# Patient Record
Sex: FEMALE | NOT HISPANIC OR LATINO | Employment: UNEMPLOYED | URBAN - METROPOLITAN AREA
[De-identification: names, ages, dates, MRNs, and addresses within clinical notes are randomized per-mention and may not be internally consistent; named-entity substitution may affect disease eponyms.]

---

## 2023-10-31 ENCOUNTER — ALLIED HEALTH/NURSE VISIT (OUTPATIENT)
Dept: PEDIATRICS | Facility: CLINIC | Age: 58
End: 2023-10-31
Payer: COMMERCIAL

## 2023-10-31 DIAGNOSIS — Z23 HIGH PRIORITY FOR 2019-NCOV VACCINE: ICD-10-CM

## 2023-10-31 DIAGNOSIS — Z23 NEED FOR PROPHYLACTIC VACCINATION AND INOCULATION AGAINST INFLUENZA: Primary | ICD-10-CM

## 2023-10-31 PROCEDURE — 90480 ADMN SARSCOV2 VAC 1/ONLY CMP: CPT

## 2023-10-31 PROCEDURE — 90471 IMMUNIZATION ADMIN: CPT

## 2023-10-31 PROCEDURE — 90686 IIV4 VACC NO PRSV 0.5 ML IM: CPT

## 2023-10-31 PROCEDURE — 99207 PR NO CHARGE NURSE ONLY: CPT

## 2023-10-31 PROCEDURE — 91320 SARSCV2 VAC 30MCG TRS-SUC IM: CPT

## 2023-12-30 ENCOUNTER — HEALTH MAINTENANCE LETTER (OUTPATIENT)
Age: 58
End: 2023-12-30

## 2024-04-13 ENCOUNTER — OFFICE VISIT (OUTPATIENT)
Dept: URGENT CARE | Facility: URGENT CARE | Age: 59
End: 2024-04-13
Payer: COMMERCIAL

## 2024-04-13 VITALS
HEART RATE: 87 BPM | RESPIRATION RATE: 16 BRPM | SYSTOLIC BLOOD PRESSURE: 124 MMHG | WEIGHT: 156 LBS | DIASTOLIC BLOOD PRESSURE: 80 MMHG | TEMPERATURE: 98.9 F | OXYGEN SATURATION: 98 %

## 2024-04-13 DIAGNOSIS — R05.1 ACUTE COUGH: ICD-10-CM

## 2024-04-13 DIAGNOSIS — J01.90 ACUTE SINUSITIS WITH SYMPTOMS > 10 DAYS: Primary | ICD-10-CM

## 2024-04-13 PROCEDURE — 99203 OFFICE O/P NEW LOW 30 MIN: CPT | Performed by: PHYSICIAN ASSISTANT

## 2024-04-13 RX ORDER — BENZONATATE 200 MG/1
200 CAPSULE ORAL 3 TIMES DAILY PRN
Qty: 21 CAPSULE | Refills: 0 | Status: SHIPPED | OUTPATIENT
Start: 2024-04-13

## 2024-04-13 RX ORDER — AMOXICILLIN 875 MG
875 TABLET ORAL 2 TIMES DAILY
Qty: 20 TABLET | Refills: 0 | Status: SHIPPED | OUTPATIENT
Start: 2024-04-13 | End: 2024-04-23

## 2024-05-15 ENCOUNTER — OFFICE VISIT (OUTPATIENT)
Dept: OPHTHALMOLOGY | Facility: CLINIC | Age: 59
End: 2024-05-15
Attending: STUDENT IN AN ORGANIZED HEALTH CARE EDUCATION/TRAINING PROGRAM
Payer: COMMERCIAL

## 2024-05-15 DIAGNOSIS — H40.003 GLAUCOMA SUSPECT, BILATERAL: Primary | ICD-10-CM

## 2024-05-15 DIAGNOSIS — H52.03 HYPEROPIA OF BOTH EYES WITH ASTIGMATISM AND PRESBYOPIA: ICD-10-CM

## 2024-05-15 DIAGNOSIS — H52.4 HYPEROPIA OF BOTH EYES WITH ASTIGMATISM AND PRESBYOPIA: ICD-10-CM

## 2024-05-15 DIAGNOSIS — H52.203 HYPEROPIA OF BOTH EYES WITH ASTIGMATISM AND PRESBYOPIA: ICD-10-CM

## 2024-05-15 PROCEDURE — 92015 DETERMINE REFRACTIVE STATE: CPT

## 2024-05-15 PROCEDURE — G0463 HOSPITAL OUTPT CLINIC VISIT: HCPCS | Performed by: STUDENT IN AN ORGANIZED HEALTH CARE EDUCATION/TRAINING PROGRAM

## 2024-05-15 PROCEDURE — 92004 COMPRE OPH EXAM NEW PT 1/>: CPT | Performed by: STUDENT IN AN ORGANIZED HEALTH CARE EDUCATION/TRAINING PROGRAM

## 2024-05-15 PROCEDURE — 92133 CPTRZD OPH DX IMG PST SGM ON: CPT | Performed by: STUDENT IN AN ORGANIZED HEALTH CARE EDUCATION/TRAINING PROGRAM

## 2024-05-15 ASSESSMENT — CONF VISUAL FIELD
METHOD: COUNTING FINGERS
OD_SUPERIOR_TEMPORAL_RESTRICTION: 0
OS_INFERIOR_NASAL_RESTRICTION: 0
OS_NORMAL: 1
OS_SUPERIOR_TEMPORAL_RESTRICTION: 0
OD_INFERIOR_TEMPORAL_RESTRICTION: 0
OS_SUPERIOR_NASAL_RESTRICTION: 0
OS_INFERIOR_TEMPORAL_RESTRICTION: 0
OD_INFERIOR_NASAL_RESTRICTION: 0
OD_SUPERIOR_NASAL_RESTRICTION: 0
OD_NORMAL: 1

## 2024-05-15 ASSESSMENT — REFRACTION_MANIFEST
OD_AXIS: 175
OS_AXIS: 105
OS_CYLINDER: +0.25
OS_ADD: +2.50
OD_SPHERE: +3.50
OS_SPHERE: +3.25
OD_CYLINDER: +0.25
OD_ADD: +2.50

## 2024-05-15 ASSESSMENT — VISUAL ACUITY
OS_CC+: -1
CORRECTION_TYPE: GLASSES
OS_CC: 20/40
OD_CC+: -1
OD_CC: 20/30
METHOD: SNELLEN - LINEAR

## 2024-05-15 ASSESSMENT — CUP TO DISC RATIO
OS_RATIO: 0.6
OD_RATIO: 0.4

## 2024-05-15 ASSESSMENT — TONOMETRY
IOP_METHOD: TONOPEN
OS_IOP_MMHG: 20
OD_IOP_MMHG: 16

## 2024-05-15 ASSESSMENT — SLIT LAMP EXAM - LIDS
COMMENTS: WNL
COMMENTS: WNL

## 2024-05-15 ASSESSMENT — REFRACTION_WEARINGRX
OS_SPHERE: +2.75
OD_SPHERE: +2.50
OD_CYLINDER: +0.75
OD_ADD: +2.25
OD_AXIS: 152
SPECS_TYPE: PAL
OS_CYLINDER: SPHERE

## 2024-05-15 ASSESSMENT — EXTERNAL EXAM - RIGHT EYE: OD_EXAM: WNL

## 2024-05-15 ASSESSMENT — EXTERNAL EXAM - LEFT EYE: OS_EXAM: WNL

## 2024-05-15 NOTE — NURSING NOTE
"Chief Complaints and History of Present Illnesses   Patient presents with    COMPREHENSIVE EYE EXAM     Patient notes her glasses broke about 1 month ago. \"I need to get a new prescription for glasses.\" Patient notes vision is stable each eye within the last couple years. Denies eye surgery.     Lashell Cho, COT 8:59 AM 05/15/2024       Chief Complaint(s) and History of Present Illness(es)       COMPREHENSIVE EYE EXAM              Laterality: both eyes    Onset: years ago    Course: stable    Associated symptoms: double vision (very few times).  Negative for dryness, eye pain, redness, tearing, trauma, headache, flashes, floaters and itching    Treatments tried: glasses    Pain scale: 0/10    Comments: Patient notes her glasses broke about 1 month ago. \"I need to get a new prescription for glasses.\" Patient notes vision is stable each eye within the last couple years. Denies eye surgery.     Lashell Cho, COT 8:59 AM 05/15/2024                      "

## 2024-05-15 NOTE — PROGRESS NOTES
"HPI       COMPREHENSIVE EYE EXAM    In both eyes.  This started years ago.  Since onset it is stable.  Associated symptoms include double vision (very few times).  Negative for dryness, eye pain, redness, tearing, trauma, headache, flashes, floaters and itching.  Treatments tried include glasses.  Pain was noted as 0/10. Additional comments: Patient notes her glasses broke about 1 month ago. \"I need to get a new prescription for glasses.\" Patient notes vision is stable each eye within the last couple years. Denies eye surgery.     GRACIE Gotti 8:59 AM 05/15/2024            Last edited by Lashell Cho on 5/15/2024  8:59 AM.          Review of systems for the eyes was negative other than the pertinent positives/negatives listed in the HPI.    Ocular Meds: none    Ocular Hx: refractive error OU    FOHx: dad - glaucoma    PMHx: History reviewed. No pertinent past medical history.      Assessment & Plan      Jocelyn Moon is a 59 year old female with the following diagnoses:    Glaucoma suspect OU  - based off of increased c/d ratio left eye and c/d ratio asymmetry  - IOP wnl, CVF full to CF OU  - dad with glaucoma  - baseline OCT RNFL wnl OU   - observe off drops    Hyperopia of both eyes with astigmatism and presbyopia  - refraction reviewed with excellent BVCA    Counseled return/RD precautions    Patient disposition:   Return in about 1 year (around 5/15/2025) for Annual Visit, or sooner changes.    Attending Physician Attestation:  Complete documentation of historical and exam elements from today's encounter can be found in the full encounter summary report (not reduplicated in this progress note).  I personally obtained the chief complaint(s) and history of present illness.  I confirmed and edited as necessary the review of systems, past medical/surgical history, family history, social history, and examination findings as documented by others; and I examined the patient myself.  I personally reviewed the " relevant tests, images, and reports as documented above.  I formulated and edited as necessary the assessment and plan and discussed the findings and management plan with the patient and family. . - Princess Krishnan MD

## 2024-05-18 ENCOUNTER — HEALTH MAINTENANCE LETTER (OUTPATIENT)
Age: 59
End: 2024-05-18

## 2024-05-30 ENCOUNTER — APPOINTMENT (OUTPATIENT)
Dept: OPTOMETRY | Facility: CLINIC | Age: 59
End: 2024-05-30
Payer: COMMERCIAL

## 2024-05-30 PROCEDURE — V2200 LENS SPHER BIFOC PLANO 4.00D: HCPCS | Mod: RT | Performed by: OPTOMETRIST

## 2024-05-30 PROCEDURE — V2020 VISION SVCS FRAMES PURCHASES: HCPCS | Performed by: OPTOMETRIST

## 2024-06-12 ENCOUNTER — APPOINTMENT (OUTPATIENT)
Dept: OPTOMETRY | Facility: CLINIC | Age: 59
End: 2024-06-12
Payer: COMMERCIAL

## 2024-06-12 ENCOUNTER — OFFICE VISIT (OUTPATIENT)
Dept: PEDIATRICS | Facility: CLINIC | Age: 59
End: 2024-06-12
Payer: COMMERCIAL

## 2024-06-12 VITALS
OXYGEN SATURATION: 97 % | TEMPERATURE: 99 F | HEART RATE: 73 BPM | RESPIRATION RATE: 18 BRPM | BODY MASS INDEX: 27.3 KG/M2 | WEIGHT: 154.1 LBS | DIASTOLIC BLOOD PRESSURE: 68 MMHG | SYSTOLIC BLOOD PRESSURE: 106 MMHG | HEIGHT: 63 IN

## 2024-06-12 DIAGNOSIS — N39.41 URGE INCONTINENCE OF URINE: Primary | ICD-10-CM

## 2024-06-12 DIAGNOSIS — K59.00 CONSTIPATION, UNSPECIFIED CONSTIPATION TYPE: ICD-10-CM

## 2024-06-12 PROBLEM — G89.29 CHRONIC NONINTRACTABLE HEADACHE: Status: ACTIVE | Noted: 2023-03-08

## 2024-06-12 PROBLEM — R51.9 CHRONIC NONINTRACTABLE HEADACHE: Status: ACTIVE | Noted: 2023-03-08

## 2024-06-12 PROBLEM — F41.9 ANXIETY: Status: ACTIVE | Noted: 2023-03-08

## 2024-06-12 PROBLEM — N39.45 CONTINUOUS LEAKAGE OF URINE: Status: ACTIVE | Noted: 2017-09-15

## 2024-06-12 PROBLEM — R53.81 MALAISE AND FATIGUE: Status: ACTIVE | Noted: 2017-09-23

## 2024-06-12 PROBLEM — H81.10 BENIGN PAROXYSMAL POSITIONAL VERTIGO: Status: ACTIVE | Noted: 2023-03-08

## 2024-06-12 PROBLEM — R53.83 MALAISE AND FATIGUE: Status: ACTIVE | Noted: 2017-09-23

## 2024-06-12 PROBLEM — G47.33 OSA (OBSTRUCTIVE SLEEP APNEA): Status: ACTIVE | Noted: 2018-10-01

## 2024-06-12 PROBLEM — E66.09 OBESITY DUE TO EXCESS CALORIES: Status: ACTIVE | Noted: 2017-09-23

## 2024-06-12 PROBLEM — R41.3 MEMORY LOSS: Status: ACTIVE | Noted: 2023-03-08

## 2024-06-12 PROCEDURE — G2211 COMPLEX E/M VISIT ADD ON: HCPCS

## 2024-06-12 PROCEDURE — 99213 OFFICE O/P EST LOW 20 MIN: CPT | Mod: GE

## 2024-06-12 PROCEDURE — 92341 FIT SPECTACLES BIFOCAL: CPT | Performed by: OPTOMETRIST

## 2024-06-12 RX ORDER — MECLIZINE HYDROCHLORIDE 25 MG/1
25 TABLET ORAL
COMMUNITY
Start: 2023-03-09

## 2024-06-12 RX ORDER — ASPIRIN 81 MG/1
1 TABLET ORAL DAILY
COMMUNITY

## 2024-06-12 RX ORDER — ESCITALOPRAM OXALATE 10 MG/1
10 TABLET ORAL DAILY
COMMUNITY
Start: 2023-03-09

## 2024-06-12 RX ORDER — PHENOL 1.4 %
600 AEROSOL, SPRAY (ML) MUCOUS MEMBRANE DAILY
COMMUNITY

## 2024-06-12 RX ORDER — ATORVASTATIN CALCIUM 20 MG/1
20 TABLET, FILM COATED ORAL DAILY
COMMUNITY
Start: 2023-03-09

## 2024-06-12 RX ORDER — ASCORBIC ACID 1000 MG
10 TABLET ORAL DAILY
COMMUNITY

## 2024-06-12 ASSESSMENT — PAIN SCALES - GENERAL: PAINLEVEL: NO PAIN (0)

## 2024-06-12 NOTE — PROGRESS NOTES
Assessment & Plan     Urge incontinence of urine  Constipation, unspecified constipation type  Symptoms clinically consistent with urge incontinence, no stress incontinence signs. Constipation also likely related to pelvic floor dysfunction as stools are soft and sometimes regular, but other times require rectal stimulation. Will send her to pelvic floor PT and uro-gynecology for re-evaluation as the last time she was seen was in 2017.  - Physical Therapy  Referral for pelvic floor PT  - Adult Uro/Gyn  Referral  - Previously on Mybetriq without relief, does not want to try other medications right now.  - Discussed miralax but stools are already soft and patient does not want to use medications.    Follow up:  Return when able to discuss other concerns (leg swelling, knee pain, cold hands and feet, vertigo, wanting thyroid and calcium testing)      Consuelo Banks is a 59 year old, presenting for the following health issues:  Urinary Problem      6/12/2024    12:44 PM   Additional Questions   Roomed by Darshana   Accompanied by spouse         6/12/2024    12:44 PM   Patient Reported Additional Medications   Patient reports taking the following new medications none     HPI     Tajik  via video used during visit, though patient prefers to mostly speak in English.     Genitourinary - Female  Onset/Duration: over 25 years, worsening  Description:   Painful urination (Dysuria): No           Frequency: YES  Blood in urine (Hematuria): No  Delay in urine (Hesitency): YES  Intensity: moderate  Progression of Symptoms:  worsening  Accompanying Signs & Symptoms:  Fever/chills: No  Flank pain: No  Nausea and vomiting: No  Vaginal symptoms: none  Abdominal/Pelvic Pain: No  History:   History of frequent UTI s: No  History of kidney stones: No  Sexually Active: No  Possibility of pregnancy: No  Precipitating or alleviating factors: None  Therapies tried and outcome: none    Feet are discolored and  "swollen, hands and feet cold, would like thyroid and calcium tested  Also has knee problems, fluid on knees   Also has vertigo and constipation    Urinary incontinence: gets frequent urge for urination, urine comes out just a little but then she has ongoing urgency. Sits on toilet for a long time. Feels like she has to go use the toilet every 45 minutes to two hours. Urine leaks out, urethral opening is near vagina so she puts toilet paper in vagina to help manage this, also wears pads. No leakage with sneezing, coughing, laughing. Does have a small amount of clear sticky discharge from urinary opening at times. No dysuria, hematuria, vaginal pain or itching, abnormal vaginal odor or discharge. No fevers, abdominal pain, nausea, vomiting, skin rashes or sores. Has tried a medicine (Mybetriq) and saw urogyn and pelvic floor PT in 2017, does not want to try medicine again as it did not help but is open to seeing urogyn and pelvic floor PT again. At that time diagnosed with overactive bladder, urge incontinence, pelvic floor tension and weakness, posterior vaginal wall prolapse, and constipation. Did home PT exercises, does not feel like they helped.    No previous surgery on bladder. No vaginal bleeding. Never has had abnormal Pap smear, but no records available. Has had hysterectomy.     Constipation: sometimes stools daily, sometimes only every 2-3 days, has to use finger stimulation if she goes long enough without stool. Stool is soft, just does not come out when she has the urge unless she uses digital stimulation. No blood or mucus in stools, no melena. Has never had a colonoscopy. Not taking medicine for stool softening. Does not want medicine for constipation as it makes her body \"weak.\" Does not drink a lot of water as this makes her urination worse. Tried miralax but did not like it. Wonders what she can do aside from drinking more water and taking medicine.        Objective    /68 (BP Location: Right " "arm, Patient Position: Sitting, Cuff Size: Adult Regular)   Pulse 73   Temp 99  F (37.2  C) (Oral)   Resp 18   Ht 1.6 m (5' 3\")   Wt 69.9 kg (154 lb 1.6 oz)   LMP  (LMP Unknown)   SpO2 97%   BMI 27.30 kg/m    Body mass index is 27.3 kg/m .  Physical Exam   Gen: awake and alert, no apparent distress, appears stated age, sitting comfortably upright in chair  HEENT: head atraumatic and normocephalic, hearing grossly normal, PERRLA, EOM grossly intact, no conjunctival injection or icterus, no nasal drainage, no oral ulcers, normal dentition, moist mucous membranes  Neck: supple, no lymphadenopathy, no stiffness, normal ROM  Back: spine is straight, spine and paraspinal muscles non-tender to palpation throughout, full ROM  CV: RRR, normal S1 and S2, no murmurs, rubs, or gallops, normal radial pulses, normal capillary refill.  Pulm: CTAB, no wheezes, rhonchi, or rales. No increased WOB on room air.  Abd: soft, non-tender, non-distended, normal bowel sounds throughout, no CVA tenderness.  Ext: 2+ non-pitting LE edema around ankles and lower legs L>R, no tenderness to calves or erythema in calves, no joint swelling, full ROM of all joints in upper and lower extremities  Skin: warm and dry, no rashes, pallor, cyanosis, jaundice, or bruising to visible skin.  Neuro: A&Ox3, answers questions appropriately, normal speech, CN II-XII grossly intact, normal muscle tone, moves all extremities equally.  Psych: normal affect, makes good eye contact          Signed Electronically by: Dulce Maria Johnson MD    "

## 2024-10-31 ENCOUNTER — THERAPY VISIT (OUTPATIENT)
Dept: PHYSICAL THERAPY | Facility: CLINIC | Age: 59
End: 2024-10-31
Attending: INTERNAL MEDICINE
Payer: COMMERCIAL

## 2024-10-31 DIAGNOSIS — N39.41 URGE INCONTINENCE OF URINE: ICD-10-CM

## 2024-10-31 DIAGNOSIS — K59.00 CONSTIPATION, UNSPECIFIED CONSTIPATION TYPE: ICD-10-CM

## 2024-10-31 PROCEDURE — 97161 PT EVAL LOW COMPLEX 20 MIN: CPT | Mod: GP

## 2024-10-31 PROCEDURE — 97530 THERAPEUTIC ACTIVITIES: CPT | Mod: GP

## 2024-10-31 NOTE — PROGRESS NOTES
PHYSICAL THERAPY EVALUATION  Type of Visit: Evaluation       Fall Risk Screen:  Fall screen completed by: PT  Have you fallen 2 or more times in the past year?: No  Have you fallen and had an injury in the past year?: No  Timed Up and Go score (seconds): No  Is patient a fall risk?: No  Fall screen comments: No    Subjective         Presenting condition or subjective complaint: (Patient-Rptd) urine incontinace,frequent visit to bath room for urination,leakage of urine  Date of onset: 06/12/24    Relevant medical history: (Patient-Rptd) Cold or hot arm or leg; Dizziness; Implanted device; Incontinence; Ongoing fever or chills; Seizures; Severe dizziness; Sleep disorder like apnea; Vision problems   Dates & types of surgery: (Patient-Rptd) removal of utarus    Prior diagnostic imaging/testing results:       Prior therapy history for the same diagnosis, illness or injury: (Patient-Rptd) No      Living Environment  Social support: (Patient-Rptd) With a significant other or spouse   Type of home: (Patient-Rptd) Multi-level; Basement   Stairs to enter the home: (Patient-Rptd) Yes (Patient-Rptd) 14 Is there a railing: (Patient-Rptd) Yes     Ramp: (Patient-Rptd) Yes   Stairs inside the home: (Patient-Rptd) Yes (Patient-Rptd) 14 Is there a railing: (Patient-Rptd) Yes     Help at home: (Patient-Rptd) None; Medication and/or finances; Emergency call system  Equipment owned:       Employment: (Patient-Rptd) Not Applicable    Hobbies/Interests:      Patient goals for therapy: (Patient-Rptd) in the start getting rid of forceful urge reulting leakage very frequently     Objective      PELVIC EVALUATION  ADDITIONAL HISTORY:  Sex assigned at birth: (Patient-Rptd) Female  Gender identity: (Patient-Rptd) Female    Pronouns: (Patient-Rptd) She/Her Hers      Bladder History:  Feels bladder filling: (Patient-Rptd) No  Triggers for feeling of inability to wait to go to the bathroom: (Patient-Rptd) Yes (Patient-Rptd) after visit out side  entering my home  How long can you wait to urinate: (Patient-Rptd) not at all  Gets up at night to urinate: (Patient-Rptd) Yes (Patient-Rptd) two or three times  Can stop the flow of urine when urinating: (Patient-Rptd) No  Volume of urine usually released: (Patient-Rptd) Small   Other issues: (Patient-Rptd) Trouble emptying bladder completely; Dribbling after urinating  Number of bladder infections in last 12 months:    Fluid intake per day: (Patient-Rptd) 3 glasses      Medications taken for bladder: (Patient-Rptd) No     Activities causing urine leak: (Patient-Rptd) Hurrying to the bathroom due to a strong urge to urinate (pee)    Amount of urine typically leaked: (Patient-Rptd) about a table spoon or sometime whole urine  Pads used to help with leaking: (Patient-Rptd) Yes I use this many pads per day: (Patient-Rptd) every time i go to bath room   I use this type/brand: (Patient-Rptd) depend fresh protection      Bowel History:  Frequency of bowel movement: (Patient-Rptd) once  Consistency of stool: (Patient-Rptd) Other (Patient-Rptd) some time hard and some time soft  Ignores the urge to defecate:    Other bowel issues:    Length of time spent trying to have a bowel movement:      Sexual Function History:  Sexual orientation: (Patient-Rptd) Prefer not to say    Sexually active: (Patient-Rptd) No  Lubrication used: (Patient-Rptd) No (Patient-Rptd) No  Pelvic pain:      Pain or difficulty with orgasms/erection/ejaculation:      State of menopause: (Patient-Rptd) Post-menopause (I am done with menopause)  Hormone medications: (Patient-Rptd) No      Are you currently pregnant: (Patient-Rptd) No  Number of previous pregnancies: (Patient-Rptd) 2 and 2 miscariage  Number of deliveries: (Patient-Rptd) 2  If you have delivered before, did you have any of these issues during delivery: (Patient-Rptd) Vaginal delivery  Have you been diagnosed with pelvic prolapse or abdominal separation: (Patient-Rptd) No  Do you get  regular exercise: (Patient-Rptd) No  Have you tried pelvic floor strengthening exercises for 4 weeks: (Patient-Rptd) No  Do you have any history of trauma that is relevant to your care that you d like to share: (Patient-Rptd) No      Discussed reason for referral regarding pelvic health needs and external/internal pelvic floor muscle examination with patient/guardian.  Opportunity provided to ask questions and verbal consent for assessment and intervention was given.    In 1993, after a urinary infection, bladder leakage began. Urge present, between a few drops and a full bladder empty. Jocelyn reports that it is very difficult to stop the flow of urine once it starts. She uses tissue paper in her vagina to stop urinary leakage. She has hx of 3rd degree uterine prolapse, so she had a hysterectomy. This did not help with continence.   POSTURE: WFL  LUMBAR SCREEN: AROM WFL  HIP SCREEN:  Strength: WFL     PELVIC/SI SCREEN:  PELVIS/SI SPECIAL TESTS: WNL  BREATHING SYMMETRY: Rib cage flaring, Decreased rib cage mobility    ABDOMINAL ASSESSMENT    Abdominal Activation/Strength:  Poor abdominal coordination, rectus dominance    Fascial Tension/Restriction: WNL    Assessment & Plan   CLINICAL IMPRESSIONS  Medical Diagnosis: Urge incontinence of urine  Constipation, unspecified constipation type    Treatment Diagnosis: Pelvic flloor dysfunction   Impression/Assessment: Patient is a 59 year old female with urinary incontinence and constipation complaints.  The following significant findings have been identified: Decreased ROM/flexibility, Decreased strength, Impaired muscle performance, and Decreased activity tolerance. These impairments interfere with their ability to perform self care tasks, recreational activities, and community mobility as compared to previous level of function.     Clinical Decision Making (Complexity):  Clinical Presentation: Stable/Uncomplicated  Clinical Presentation Rationale: based on medical and  personal factors listed in PT evaluation  Clinical Decision Making (Complexity): Low complexity    PLAN OF CARE  Treatment Interventions:  Modalities: Biofeedback  Interventions: Manual Therapy, Neuromuscular Re-education, Therapeutic Activity, Therapeutic Exercise, Self-Care/Home Management    Long Term Goals     PT Goal 1  Goal Identifier: Bulge out  Goal Description: The patient will be able to bulge out visibly when cued verbally.  Rationale: to maximize safety and independence with performance of ADLs and functional tasks;to maximize safety and independence with self cares  Target Date: 01/09/25  PT Goal 2  Goal Identifier: Pelvic floor contraction (rectal)  Goal Description: The patient will demonstrate both a squeeze and a pull forward rectally during a pelvic floor contraction.  Rationale: to maximize safety and independence with performance of ADLs and functional tasks;to maximize safety and independence with self cares  Target Date: 01/09/25  PT Goal 3  Goal Identifier: Bowel habits  Goal Description: The patient will have 1-2 soft-formed bowel movements daily.  Rationale: to maximize safety and independence with performance of ADLs and functional tasks;to maximize safety and independence with self cares  Target Date: 01/09/25  PT Goal 4  Goal Identifier: Goal 4  Goal Description: The patient will use the restroom less than 8 times a day and no more than 1 x at night to maximize independence and restorative sleep patterns.  Rationale: to maximize safety and independence within the home;to maximize safety and independence within the community;to maximize safety and independence with transportation;to maximize safety and independence with self cares;to maximize safety and independence with performance of ADLs and functional tasks  Target Date: 01/09/25      Frequency of Treatment: 1 x per week  Duration of Treatment: 10 weeks  Education Assessment:   Learner/Method: Patient;Pictures/Video;No Barriers to  Learning;Demonstration;Listening;Reading    Risks and benefits of evaluation/treatment have been explained.   Patient/Family/caregiver agrees with Plan of Care.     Evaluation Time:     PT Eval, Low Complexity Minutes (42144): 10  Evaluation Only     Signing Clinician: ELENI Velez [facility]                                                                                   OUTPATIENT PHYSICAL THERAPY      PLAN OF TREATMENT FOR OUTPATIENT REHABILITATION   Patient's Last Name, First Name, Jocelyn Brito YOB: 1965   Provider's Name   [facility]   Medical Record No.  [id number]     Onset Date: 06/12/24  Start of Care Date: 10/31/24     Medical Diagnosis:  Urge incontinence of urine  Constipation, unspecified constipation type      PT Treatment Diagnosis:  Pelvic flloor dysfunction Plan of Treatment  Frequency/Duration: 1 x per week/ 10 weeks    Certification date from 10/31/24 to 01/09/25         See note for plan of treatment details and functional goals     Kateryna Huerta PT                         I CERTIFY THE NEED FOR THESE SERVICES FURNISHED UNDER        THIS PLAN OF TREATMENT AND WHILE UNDER MY CARE     (Physician attestation of this document indicates review and certification of the therapy plan).              Referring Provider:  Soumya Howard    Initial Assessment  See Epic Evaluation- Start of Care Date: 10/31/24

## 2024-12-05 ENCOUNTER — TELEPHONE (OUTPATIENT)
Dept: PHYSICAL THERAPY | Facility: CLINIC | Age: 59
End: 2024-12-05
Payer: COMMERCIAL

## 2024-12-05 DIAGNOSIS — K59.00 CONSTIPATION, UNSPECIFIED CONSTIPATION TYPE: Primary | ICD-10-CM

## 2025-03-10 PROBLEM — K59.00 CONSTIPATION: Status: RESOLVED | Noted: 2017-09-15 | Resolved: 2025-03-10

## 2025-03-25 ENCOUNTER — OFFICE VISIT (OUTPATIENT)
Dept: UROLOGY | Facility: CLINIC | Age: 60
End: 2025-03-25
Payer: COMMERCIAL

## 2025-03-25 VITALS — DIASTOLIC BLOOD PRESSURE: 88 MMHG | HEART RATE: 73 BPM | OXYGEN SATURATION: 98 % | SYSTOLIC BLOOD PRESSURE: 135 MMHG

## 2025-03-25 DIAGNOSIS — N39.41 URGENCY INCONTINENCE: Primary | ICD-10-CM

## 2025-03-25 DIAGNOSIS — R19.8 ABNORMAL DEFECATION: ICD-10-CM

## 2025-03-25 DIAGNOSIS — Z98.890 HISTORY OF PELVIC SURGERY: ICD-10-CM

## 2025-03-25 DIAGNOSIS — K59.00 CONSTIPATION, UNSPECIFIED CONSTIPATION TYPE: ICD-10-CM

## 2025-03-25 LAB
ALBUMIN UR-MCNC: NEGATIVE MG/DL
AMORPH CRY #/AREA URNS HPF: ABNORMAL /HPF
APPEARANCE UR: CLEAR
BILIRUB UR QL STRIP: NEGATIVE
COLOR UR AUTO: YELLOW
GLUCOSE UR STRIP-MCNC: NEGATIVE MG/DL
HGB UR QL STRIP: ABNORMAL
KETONES UR STRIP-MCNC: NEGATIVE MG/DL
LEUKOCYTE ESTERASE UR QL STRIP: NEGATIVE
MUCOUS THREADS #/AREA URNS LPF: PRESENT /LPF
NITRATE UR QL: NEGATIVE
PH UR STRIP: 5.5 [PH] (ref 5–7)
RBC URINE: 0 /HPF
RESIDUAL VOLUME (RV) (EXTERNAL): 22
SP GR UR STRIP: >=1.03 (ref 1–1.03)
SQUAMOUS EPITHELIAL: <1 /HPF
UROBILINOGEN UR STRIP-ACNC: 0.2 E.U./DL
WBC URINE: 4 /HPF

## 2025-03-25 PROCEDURE — 81001 URINALYSIS AUTO W/SCOPE: CPT | Performed by: UROLOGY

## 2025-03-25 NOTE — PATIENT INSTRUCTIONS
Happy early birthday!    Websites with free information:    [employer] patient website: www.voicesforpfd.org    Total Control Program: www.totalcontrolprogram.com    Please do double voiding    It was a pleasure meeting with you today.  Thank you for allowing me and my team the privilege of caring for you today.  YOU are the reason we are here, and I truly hope we provided you with the excellent service you deserve.  Please let us know if there is anything else we can do for you so that we can be sure you are leaving completely satisfied with your care experience.    Cystoscopy    Cystoscopy is a procedure that lets your doctor look directly inside your urethra and bladder. It can be used to:  Help diagnose a problem with your urethra, bladder, or kidneys.  Take a sample (biopsy) of bladder or urethral tissue.  Treat certain problems (such as removing kidney stones).  Place a stent to bypass an obstruction.  Take special X-rays of the kidneys.  Based on the findings, your doctor may recommend other tests or treatments.  What is a cystoscope?  A cystoscope is a telescope-like instrument that contains lenses and fiberoptics (small glass wires that make bright light). The cystoscope may be straight and rigid, or flexible to bend around curves in the urethra. The doctor may look directly into the cystoscope, or project the image onto a monitor.  Getting ready  Ask your doctor if you should stop taking any medicines before the procedure.  Follow any other instructions your doctor gives you.  Tell your doctor before the exam if you:  Take any medicines, such as aspirin or blood thinners  Have allergies to any medicines  Are pregnant   The procedure  Cystoscopy is done in the doctor s office, surgery center, or hospital. The doctor and a nurse are present during the procedure. It takes only a few minutes, longer if a biopsy, X-ray, or treatment needs to be done.  During the procedure:  You lie on an  exam table on your back, knees bent and legs apart. You are covered with a drape.  Your urethra and the area around it are washed. Anesthetic jelly may be applied to numb the urethra.  The cystoscope is inserted. A sterile fluid is put into the bladder to expand it. You may feel pressure from this fluid.  When the procedure is done, the cystoscope is removed.  After the procedure   Once you re home:  Drink plenty of fluids.  You may have burning or light bleeding when you urinate--this is normal.  Medicines may be prescribed to ease any discomfort or prevent infection. Take these as directed.  Call your doctor if you have heavy bleeding or blood clots, burning that lasts more than a day, a fever over 100 F  (38  C), or trouble urinating.  Date Last Reviewed: 1/1/2017 2000-2017 [employer]. [address] [place] [address]. All rights reserved. This information is not intended as a substitute for professional medical care. Always follow your healthcare professional's instructions.

## 2025-03-25 NOTE — NURSING NOTE
Chief Complaint   Patient presents with    Urinary Problem     Patient here today discuss her incontinence          Patient state she has some incontinence   Patient state she has frequency  Patient it feel like she only only urinate a little at a time  Patient that she has leakage as well  Patient voided in clinic  Patient PVR 22 ml by bladder scan           [id number] Fior  Very kind lady Lexus Bauer, [facility]

## 2025-03-25 NOTE — PROGRESS NOTES
March 25, 2025    Referring Provider: Referred Self, MD  No address on file    Primary Care Provider: No Ref-Primary, Physician    {PROVIDER CHARTING PREFERENCE:[id number]}    *** minutes were spent on this day of the encounter in reviewing the EMR including ***, direct patient care, coordination of care and documentation    Crystal Oconnell MD MPH  (she/her/hers)   of Urology  [facility]      HPI:  Jocelyn Moon is a 59 year old female who presents for evaluation of her pelvic floor symptoms.  She does speak English but the entire visit is conducted with the assistance of a Occitan  as needed. has ***.  with ***    Has been having urine leaking for at least 25 years, soon as she gets the urge she will sometimes leak a couple drops other times a large volume, wears a diaper.  She has tried one medication but did not help.  Also constipation ***    Denies gross hematuria, UTI, stress incontinence, vaginal bulge, dyspareunia.    Had a hysterectomy for prolapse back in [place].    Health maintenance screening:  Pap smear ***  Colonoscopy ***  Mammogram ***    History reviewed. No pertinent past medical history.    History reviewed. No pertinent surgical history.    Social History     Socioeconomic History    Marital status:      Spouse name: Not on file    Number of children: Not on file    Years of education: Not on file    Highest education level: Not on file   Occupational History    Not on file   Tobacco Use    Smoking status: Never    Smokeless tobacco: Never   Vaping Use    Vaping status: Never Used   Substance and Sexual Activity    Alcohol use: Not on file    Drug use: Not on file    Sexual activity: Not on file   Other Topics Concern    Not on file   Social History Narrative    Not on file     Social Drivers of Health     Financial Resource Strain: Not on file   Food Insecurity: Not on file   Transportation Needs: Not on file   Physical Activity: Not on file   Stress:  Not on file   Social Connections: Not on file   Interpersonal Safety: Low Risk  (10/31/2024)    Interpersonal Safety     Do you feel physically and emotionally safe where you currently live?: Yes     Within the past 12 months, have you been hit, slapped, kicked or otherwise physically hurt by someone?: No     Within the past 12 months, have you been humiliated or emotionally abused in other ways by your partner or ex-partner?: No   Housing Stability: Not on file     Family History   Problem Relation Age of Onset    Glaucoma No family hx of     Macular Degeneration No family hx of      ROS    No Known Allergies    Current Outpatient Medications   Medication Sig Dispense Refill    aspirin 81 MG EC tablet Take 1 tablet by mouth daily      atorvastatin (LIPITOR) 20 MG tablet Take 20 mg by mouth daily      benzonatate (TESSALON) 200 MG capsule Take 1 capsule (200 mg) by mouth 3 times daily as needed for cough (Patient not taking: Reported on 5/15/2024) 21 capsule 0    calcium carbonate (OS-FELIBERTO) 600 MG tablet Take 600 mg by mouth daily      Coenzyme Q10 (CO Q 10) 10 MG CAPS Take 10 mg by mouth daily      escitalopram (LEXAPRO) 10 MG tablet Take 10 mg by mouth daily      meclizine (ANTIVERT) 25 MG tablet Take 25 mg by mouth       No current facility-administered medications for this visit.     /88   Pulse 73   LMP  (LMP Unknown)   SpO2 98%   GENERAL: healthy, alert and no distress  EYES: Eyes grossly normal to inspection, conjunctivae and sclerae normal  HENT: normal cephalic/atraumatic.  External ears, nose and mouth without ulcers or lesions.  RESP: no audible wheeze, cough, or visible cyanosis.  No visible retractions or increased work of breathing.  Able to speak fully in complete sentences.  NEURO: Cranial nerves grossly intact, mentation intact and speech normal  PSYCH: mentation appears normal, affect normal/bright, judgement and insight intact, normal speech and appearance well-groomed    Urine dip small  blood, urinalysis 0 RBC, 4 WBC, < 1 squam but some crystals and mucus    PVR 22 mL by bladder scan      CC  Patient Care Team:  No Ref-Primary, Physician as PCP - General  No Ref-Primary, Physician  Anjali Jean Baptiste OD as MD (Ophthalmology)  Dulce Maria Johnson MD as Assigned PCP  Crystal Oconnell MD as MD (Urology)  Princess Krishnan MD as Assigned Surgical Provider  SELF, REFERRED

## 2025-04-07 ENCOUNTER — TELEPHONE (OUTPATIENT)
Dept: GASTROENTEROLOGY | Facility: CLINIC | Age: 60
End: 2025-04-07
Payer: COMMERCIAL

## 2025-04-07 NOTE — TELEPHONE ENCOUNTER
"Endoscopy Scheduling Screen    Have you had any respiratory illness or flu-like symptoms in the last 10 days?  No    What is your communication preference for Instructions and/or Bowel Prep?   MAIL    What insurance is in the chart?  Other:  BP    Ordering/Referring Provider:     JIGNA ARREOLA      (If ordering provider performs procedure, schedule with ordering provider unless otherwise instructed. )    BMI: Estimated body mass index is 27.3 kg/m  as calculated from the following:    Height as of 6/12/24: 1.6 m (5' 3\").    Weight as of 6/12/24: 69.9 kg (154 lb 1.6 oz).     Sedation Ordered  moderate sedation.   If patient BMI > 50 do not schedule in ASC.    If patient BMI > 45 do not schedule at [facility].    Are you taking methadone or Suboxone?  NO, No RN review required.    Have you been diagnosed and are being treated for severe PTSD or severe anxiety?  NO, No RN review required.    Are you taking any prescription medications for pain 3 or more times per week?   NO, No RN review required.    Do you have a history of malignant hyperthermia?  No    (Females) Are you currently pregnant?        Have you been diagnosed or told you have pulmonary hypertension?   No    Do you have an LVAD?  No    Have you been told you have moderate to severe sleep apnea?  Yes. Do you use a CPAP? No. (RN Review required for scheduling unless scheduling in Hospital.)     Have you been told you have COPD, asthma, or any other lung disease?  No    Has your doctor ordered any cardiac tests like echo, angiogram, stress test, ablation, or EKG, that you have not completed yet?  No    Do you  have a history of any heart conditions?  No     Have you ever had or are you waiting for an organ transplant?  No. Continue scheduling, no site restrictions.    Have you had a stroke or transient ischemic attack (TIA aka \"mini stroke\") in the last 2 years?   No.    Have you been diagnosed with or been told you have cirrhosis of the liver?   No.    Are " "you currently on dialysis?   No    Do you need assistance transferring?   No    BMI: Estimated body mass index is 27.3 kg/m  as calculated from the following:    Height as of 6/12/24: 1.6 m (5' 3\").    Weight as of 6/12/24: 69.9 kg (154 lb 1.6 oz).     Is patients BMI > 40 and scheduling location [facility]?  No    Do you take an injectable or oral medication for weight loss or diabetes (excluding insulin)?  No    Do you take the medication Naltrexone?  No    Do you take blood thinners?  No       Prep   Are you currently on dialysis or do you have chronic kidney disease?  No    Do you have a diagnosis of diabetes?  No    Do you have a diagnosis of cystic fibrosis (CF)?  No    On a regular basis do you go 3 -5 days between bowel movements?  No    BMI > 40?  No    Preferred Pharmacy:    [facility] - [address]   [address]   [address]  [place] [address]  Phone: [phone] Fax: [phone]      Final Scheduling Details     Procedure scheduled  Colonoscopy    Surgeon:  IAN     Date of procedure:  4/28     Pre-OP / PAC:   No - Not required for this site.    Location  [facility] - Per order.    Sedation   Moderate Sedation - Per order.      Patient Reminders:   You will receive a call from a Nurse to review instructions and health history.  This assessment must be completed prior to your procedure.  Failure to complete the Nurse assessment may result in the procedure being cancelled.      On the day of your procedure, please designate an adult(s) who can drive you home stay with you for the next 24 hours. The medicines used in the exam will make you sleepy. You will not be able to drive.      You cannot take public transportation, ride share services, or non-medical taxi service without a responsible caregiver.  Medical transport services are allowed with the requirement that a responsible caregiver will receive you at your destination.  We require that drivers and caregivers " are confirmed prior to your procedure.

## 2025-04-15 ENCOUNTER — TELEPHONE (OUTPATIENT)
Dept: GASTROENTEROLOGY | Facility: CLINIC | Age: 60
End: 2025-04-15
Payer: COMMERCIAL

## 2025-04-15 NOTE — TELEPHONE ENCOUNTER
Caller: Jocelyn tranferred to scheduling by pre assessment nurse    Reason for Reschedule/Cancellation (please be detailed, any staff messages or encounters to note?):   Pt wants only females in the room during their procedure. Or if there are males in the room the Pt requests that they are behind a curtain so they don't see anything.    Did you cancel or rescheduled an E[facility] procedure? No.    Is screening questionnaire older than 3 months from the reschedule date.   If Yes, please complete screening questionnaire. No    Prior to reschedule please review:  Ordering Provider: JIGNA ARREOLA   Sedation Determined: moderate  Does patient have any ASC Exclusions, please identify?: y, SELENA    Notes on Cancelled Procedure:  Procedure: Lower Endoscopy [Colonoscopy]   Date: 4/28/25  Location: [facility]; [address] [place] [address]  Surgeon: IAN    Rescheduled: No, Escalation sent to Ije on how to proceed with rescheduling the Pt. Case in [place]

## 2025-05-14 ENCOUNTER — OFFICE VISIT (OUTPATIENT)
Dept: UROLOGY | Facility: CLINIC | Age: 60
End: 2025-05-14
Payer: COMMERCIAL

## 2025-05-14 VITALS
DIASTOLIC BLOOD PRESSURE: 81 MMHG | HEART RATE: 75 BPM | HEIGHT: 63 IN | SYSTOLIC BLOOD PRESSURE: 121 MMHG | OXYGEN SATURATION: 99 % | BODY MASS INDEX: 27.3 KG/M2

## 2025-05-14 DIAGNOSIS — N39.41 URGENCY INCONTINENCE: Primary | ICD-10-CM

## 2025-05-14 DIAGNOSIS — M62.89 PELVIC FLOOR DYSFUNCTION: ICD-10-CM

## 2025-05-14 DIAGNOSIS — M79.18 MYALGIA OF PELVIC FLOOR: ICD-10-CM

## 2025-05-14 DIAGNOSIS — Z98.890 HISTORY OF PELVIC SURGERY: ICD-10-CM

## 2025-05-14 LAB
ALBUMIN UR-MCNC: NEGATIVE MG/DL
APPEARANCE UR: CLEAR
BILIRUB UR QL STRIP: NEGATIVE
COLOR UR AUTO: YELLOW
GLUCOSE UR STRIP-MCNC: NEGATIVE MG/DL
HGB UR QL STRIP: ABNORMAL
KETONES UR STRIP-MCNC: NEGATIVE MG/DL
LEUKOCYTE ESTERASE UR QL STRIP: NEGATIVE
NITRATE UR QL: NEGATIVE
PH UR STRIP: 5.5 [PH] (ref 5–7)
SP GR UR STRIP: >=1.03 (ref 1–1.03)
UROBILINOGEN UR STRIP-ACNC: 0.2 E.U./DL

## 2025-05-14 PROCEDURE — 81003 URINALYSIS AUTO W/O SCOPE: CPT | Mod: QW | Performed by: UROLOGY

## 2025-05-14 NOTE — PROGRESS NOTES
"May 14, 2025    Return visit    Patient returns today for follow up of ***.      Voids between 2 and 10 oz.  Drinks ~2L water a day, void about the same amount She denies any changes in her health since last visit.    /81   Pulse 75   Ht 1.6 m (5' 3\")   LMP  (LMP Unknown)   SpO2 99%   BMI 27.30 kg/m    She is comfortable, in no distress, non-labored breathing.  Abdomen is soft, non-tender, non-distended.  Normal external female genitalia.  Negative CST.  Pelvic exam is remarkable for ***.    Cystoscopy Note: After informed consent was obtained patient was prepped and draped in the standard fashion.  The flexible cystoscope was inserted into a normal appearing urethral meatus.  The urothelium was carefully examined and there were *** tumors, masses, stones, foreign bodies, or other urothelial abnormalities noted.  Bilateral ureteral orifices were *** noted in the normal orthotopic position and both effluxed *** urine.  The cystoscope was retroflexed and the bladder neck was ***.  The urethra was carefully examined upon removing the cystoscope and was ***.  Patient tolerated the procedure without complications noted.      trabec    A/P: 60 year old F with (N39.41) Urgency incontinence  (primary encounter diagnosis)  Comment: ***  Plan: UA without Microscopic [[id number]],         CYSTOURETHROSCOPY        ***    (Z98.890) History of pelvic surgery  Comment: ***  Plan: ***        ***    *** minutes were spent today on the date of the encounter in reviewing the EMR, direct patient care, coordination of care and documentation in addition to the cystoscopy procedure    Crystal Oconnell MD MPH  (she/her/hers)   of Urology  [facility]    CC  Patient Care Team:  No Ref-Primary, Physician as PCP - General  No Ref-Primary, Physician  Anjali Jean Baptiste OD as MD (Ophthalmology)  Dulce Maria Johnson MD as Assigned PCP  Crystal Oconnell MD as MD (Urology)  Crystal Oconnell MD as " Assigned Surgical Provider

## 2025-05-14 NOTE — NURSING NOTE
Chief Complaint   Patient presents with    Urge Incontinence     Patient here today for cystoscopy        UA RESULTS:  Recent Labs   Lab Test 05/14/25  1545 03/25/25  1024   COLOR Yellow  --    APPEARANCE Clear  --    URINEGLC Negative  --    URINEBILI Negative  --    URINEKETONE Negative  --    SG >=1.030  --    UBLD Trace*  --    URINEPH 5.5  --    PROTEIN Negative  --    UROBILINOGEN 0.2  --    NITRITE Negative  --    LEUKEST Negative  --    RBCU  --  0   WBCU  --  4         Prior to the start of the procedure and with procedural staff participation, I verbally confirmed the patient s identity using two indicators, relevant allergies, that the procedure was appropriate and matched the consent or emergent situation, and that the correct equipment/implants were available. Immediately prior to starting the procedure I conducted the Time Out with the procedural staff and re-confirmed the patient s name, procedure, and site/side. I have wiped the patient off with the povidone-Iodine solution, draped them,  used Lidocaine hydrochloride jelly, and instilled sterile water into the bladder. (The Joint Commission universal protocol was followed.)  Yes    Sedation (Moderate or Deep): None    5mL 2% lidocaine hydrochloride Urojet instilled into urethra.    NDC# [address]-6  Lot #: [id number]  Expiration Date:  12/26     Tommy [id number] was our 1st had to leave at 4pm     Jacquelin [id number] was our 2nd we had to call back    DONOVAN Grimaldo

## 2025-05-14 NOTE — Clinical Note
5/14/2025       RE: Jocelyn Moon  [address] [address]     Dear Colleague,    Thank you for referring your patient, Jocelyn Moon, to the [facility] at [facility]. Please see a copy of my visit note below.    No notes on file    Again, thank you for allowing me to participate in the care of your patient.      Sincerely,    Crystal Oconnell MD

## 2025-05-14 NOTE — PATIENT INSTRUCTIONS
"Websites with free information:    [employer] patient website: www.voicesforpfd.org    Total Control Program: www.totalcontrolprogram.com    Please see one of the dedicated pelvic floor physical therapist. If you have not heard from the scheduling office within 2 business days, please call [phone] for [employer]    Please return to see MARY KAY in 6 months, sooner if needed    It was a pleasure meeting with you today.  Thank you for allowing me and my team the privilege of caring for you today.  YOU are the reason we are here, and I truly hope we provided you with the excellent service you deserve.  Please let us know if there is anything else we can do for you so that we can be sure you are leaving completely satisfied with your care experience.    AFTER YOUR CYSTOSCOPY  ?  ?  You have just completed a cystoscopy, or \"cysto\", which allowed your physician to learn more about your bladder (or to remove a stent placed after surgery). We suggest that you continue to avoid caffeine, fruit juice, and alcohol for the next 24 hours, however, you are encouraged to return to your normal activities.  ?  ?  A few things that are considered normal after your cystoscopy:  ?  * small amount of bleeding (or spotting) that clears within the next 24 hours  ?  * slight burning sensation with urination  ?  * sensation of needing to void (urinate) more frequently  ?  * the feeling of \"air\" in your urine  ?  * mild discomfort that is relieved with Tylenol    * bladder spasms  ?  ?  ?  Please contact our office promptly if you:  ?  * develop a fever above 101 degrees  ?  * are unable to urinate  ?  * develop bright red blood that does not stop  ?  * experience severe pain or swelling  ?  ?  ?  And of course, please contact our office with any concerns or questions [phone].  "

## 2025-08-17 ASSESSMENT — SOCIAL DETERMINANTS OF HEALTH (SDOH): HOW OFTEN DO YOU GET TOGETHER WITH FRIENDS OR RELATIVES?: ONCE A WEEK

## 2025-08-18 ENCOUNTER — OFFICE VISIT (OUTPATIENT)
Dept: FAMILY MEDICINE | Facility: CLINIC | Age: 60
End: 2025-08-18
Payer: COMMERCIAL

## 2025-08-18 VITALS
HEART RATE: 67 BPM | SYSTOLIC BLOOD PRESSURE: 130 MMHG | TEMPERATURE: 98 F | HEIGHT: 59 IN | DIASTOLIC BLOOD PRESSURE: 88 MMHG | RESPIRATION RATE: 12 BRPM | OXYGEN SATURATION: 100 % | WEIGHT: 155 LBS | BODY MASS INDEX: 31.25 KG/M2

## 2025-08-18 DIAGNOSIS — Z12.11 COLON CANCER SCREENING: ICD-10-CM

## 2025-08-18 DIAGNOSIS — Z00.00 ANNUAL PHYSICAL EXAM: Primary | ICD-10-CM

## 2025-08-18 DIAGNOSIS — R60.0 LEG EDEMA: ICD-10-CM

## 2025-08-18 DIAGNOSIS — R42 VERTIGO: ICD-10-CM

## 2025-08-18 DIAGNOSIS — G47.33 OSA (OBSTRUCTIVE SLEEP APNEA): ICD-10-CM

## 2025-08-18 DIAGNOSIS — Z12.31 VISIT FOR SCREENING MAMMOGRAM: ICD-10-CM

## 2025-08-18 DIAGNOSIS — N39.41 URGENCY INCONTINENCE: ICD-10-CM

## 2025-08-18 PROBLEM — Z12.4 CERVICAL CANCER SCREENING: Status: ACTIVE | Noted: 2025-08-18

## 2025-08-18 LAB
ERYTHROCYTE [DISTWIDTH] IN BLOOD BY AUTOMATED COUNT: 12.1 % (ref 10–15)
EST. AVERAGE GLUCOSE BLD GHB EST-MCNC: 108 MG/DL
HBA1C MFR BLD: 5.4 % (ref 0–5.6)
HCT VFR BLD AUTO: 43.7 % (ref 35–47)
HGB BLD-MCNC: 13.7 G/DL (ref 11.7–15.7)
MCH RBC QN AUTO: 31.3 PG (ref 26.5–33)
MCHC RBC AUTO-ENTMCNC: 31.4 G/DL (ref 31.5–36.5)
MCV RBC AUTO: 99.8 FL (ref 78–100)
PLATELET # BLD AUTO: 290 10E3/UL (ref 150–450)
RBC # BLD AUTO: 4.38 10E6/UL (ref 3.8–5.2)
WBC # BLD AUTO: 8.08 10E3/UL (ref 4–11)

## 2025-08-18 PROCEDURE — 84443 ASSAY THYROID STIM HORMONE: CPT | Performed by: INTERNAL MEDICINE

## 2025-08-18 PROCEDURE — 99386 PREV VISIT NEW AGE 40-64: CPT | Mod: 25 | Performed by: INTERNAL MEDICINE

## 2025-08-18 PROCEDURE — 90677 PCV20 VACCINE IM: CPT | Performed by: INTERNAL MEDICINE

## 2025-08-18 PROCEDURE — 36415 COLL VENOUS BLD VENIPUNCTURE: CPT | Performed by: INTERNAL MEDICINE

## 2025-08-18 PROCEDURE — 90471 IMMUNIZATION ADMIN: CPT | Performed by: INTERNAL MEDICINE

## 2025-08-18 PROCEDURE — 83540 ASSAY OF IRON: CPT | Performed by: INTERNAL MEDICINE

## 2025-08-18 PROCEDURE — 80053 COMPREHEN METABOLIC PANEL: CPT | Performed by: INTERNAL MEDICINE

## 2025-08-18 PROCEDURE — 99213 OFFICE O/P EST LOW 20 MIN: CPT | Mod: 25 | Performed by: INTERNAL MEDICINE

## 2025-08-18 PROCEDURE — 80061 LIPID PANEL: CPT | Performed by: INTERNAL MEDICINE

## 2025-08-18 PROCEDURE — 83036 HEMOGLOBIN GLYCOSYLATED A1C: CPT | Performed by: INTERNAL MEDICINE

## 2025-08-18 PROCEDURE — 82728 ASSAY OF FERRITIN: CPT | Performed by: INTERNAL MEDICINE

## 2025-08-18 PROCEDURE — 85027 COMPLETE CBC AUTOMATED: CPT | Performed by: INTERNAL MEDICINE

## 2025-08-18 PROCEDURE — 83550 IRON BINDING TEST: CPT | Performed by: INTERNAL MEDICINE

## 2025-08-18 RX ORDER — OMEPRAZOLE 20 MG/1
20 CAPSULE, DELAYED RELEASE ORAL DAILY
COMMUNITY

## 2025-08-18 ASSESSMENT — PAIN SCALES - GENERAL: PAINLEVEL_OUTOF10: NO PAIN (0)

## 2025-08-19 ENCOUNTER — PATIENT OUTREACH (OUTPATIENT)
Dept: CARE COORDINATION | Facility: CLINIC | Age: 60
End: 2025-08-19
Payer: COMMERCIAL

## 2025-08-19 LAB
ALBUMIN SERPL BCG-MCNC: 4.3 G/DL (ref 3.5–5.2)
ALP SERPL-CCNC: 70 U/L (ref 40–150)
ALT SERPL W P-5'-P-CCNC: 21 U/L (ref 0–50)
ANION GAP SERPL CALCULATED.3IONS-SCNC: 10 MMOL/L (ref 7–15)
AST SERPL W P-5'-P-CCNC: 26 U/L (ref 0–45)
BILIRUB SERPL-MCNC: 0.3 MG/DL
BUN SERPL-MCNC: 16.9 MG/DL (ref 8–23)
CALCIUM SERPL-MCNC: 10 MG/DL (ref 8.8–10.4)
CHLORIDE SERPL-SCNC: 105 MMOL/L (ref 98–107)
CHOLEST SERPL-MCNC: 243 MG/DL
CREAT SERPL-MCNC: 0.6 MG/DL (ref 0.51–0.95)
EGFRCR SERPLBLD CKD-EPI 2021: >90 ML/MIN/1.73M2
FASTING STATUS PATIENT QL REPORTED: NO
FASTING STATUS PATIENT QL REPORTED: NO
FERRITIN SERPL-MCNC: 106 NG/ML (ref 11–328)
GLUCOSE SERPL-MCNC: 93 MG/DL (ref 70–99)
HCO3 SERPL-SCNC: 26 MMOL/L (ref 22–29)
HDLC SERPL-MCNC: 60 MG/DL
IRON BINDING CAPACITY (ROCHE): 332 UG/DL (ref 240–430)
IRON SATN MFR SERPL: 18 % (ref 15–46)
IRON SERPL-MCNC: 61 UG/DL (ref 37–145)
LDLC SERPL CALC-MCNC: 153 MG/DL
NONHDLC SERPL-MCNC: 183 MG/DL
POTASSIUM SERPL-SCNC: 5.7 MMOL/L (ref 3.4–5.3)
PROT SERPL-MCNC: 7.3 G/DL (ref 6.4–8.3)
SODIUM SERPL-SCNC: 141 MMOL/L (ref 135–145)
TRIGL SERPL-MCNC: 149 MG/DL
TSH SERPL DL<=0.005 MIU/L-ACNC: 1.13 UIU/ML (ref 0.3–4.2)